# Patient Record
Sex: FEMALE | URBAN - METROPOLITAN AREA
[De-identification: names, ages, dates, MRNs, and addresses within clinical notes are randomized per-mention and may not be internally consistent; named-entity substitution may affect disease eponyms.]

---

## 2022-11-01 ENCOUNTER — APPOINTMENT (RX ONLY)
Dept: URBAN - METROPOLITAN AREA CLINIC 15 | Facility: CLINIC | Age: 22
Setting detail: DERMATOLOGY
End: 2022-11-01

## 2022-11-01 VITALS — WEIGHT: 120 LBS | HEIGHT: 64 IN

## 2022-11-01 DIAGNOSIS — L71.8 OTHER ROSACEA: ICD-10-CM

## 2022-11-01 PROCEDURE — ? PRESCRIPTION

## 2022-11-01 PROCEDURE — 99203 OFFICE O/P NEW LOW 30 MIN: CPT

## 2022-11-01 PROCEDURE — ? PRESCRIPTION MEDICATION MANAGEMENT

## 2022-11-01 RX ORDER — TRETIONIN 0.5 MG/G
1 CREAM TOPICAL
Qty: 45 | Refills: 0 | Status: ERX | COMMUNITY
Start: 2022-11-01

## 2022-11-01 RX ORDER — AZELAIC ACID 0.15 G/G
1 GEL TOPICAL QAM
Qty: 50 | Refills: 4 | Status: ERX | COMMUNITY
Start: 2022-11-01

## 2022-11-01 RX ORDER — SODIUM SULFACETAMIDE 100 MG/ML
1 LIQUID TOPICAL QAM
Qty: 480 | Refills: 3 | Status: ERX | COMMUNITY
Start: 2022-11-01

## 2022-11-01 RX ORDER — MINOCYCLINE 40 MG/G
1 AEROSOL, FOAM TOPICAL QHS
Qty: 30 | Refills: 4 | Status: ERX | COMMUNITY
Start: 2022-11-01

## 2022-11-01 RX ADMIN — AZELAIC ACID 1: 0.15 GEL TOPICAL at 00:00

## 2022-11-01 RX ADMIN — MINOCYCLINE 1: 40 AEROSOL, FOAM TOPICAL at 00:00

## 2022-11-01 RX ADMIN — TRETIONIN 1: 0.5 CREAM TOPICAL at 00:00

## 2022-11-01 RX ADMIN — SODIUM SULFACETAMIDE 1: 100 LIQUID TOPICAL at 00:00

## 2022-11-01 ASSESSMENT — LOCATION SIMPLE DESCRIPTION DERM
LOCATION SIMPLE: LEFT CHEEK
LOCATION SIMPLE: RIGHT CHEEK

## 2022-11-01 ASSESSMENT — LOCATION DETAILED DESCRIPTION DERM
LOCATION DETAILED: LEFT INFERIOR MEDIAL MALAR CHEEK
LOCATION DETAILED: LEFT INFERIOR CENTRAL MALAR CHEEK
LOCATION DETAILED: RIGHT INFERIOR CENTRAL MALAR CHEEK

## 2022-11-01 ASSESSMENT — LOCATION ZONE DERM: LOCATION ZONE: FACE

## 2022-11-01 NOTE — PROCEDURE: PRESCRIPTION MEDICATION MANAGEMENT
Initiate Treatment: .\\n\\nAM\\n\\n- Wash with sulfa Wash once daily in the mornings. \\n- Azelaic acid- Apply a thin layer to face once daily. \\n- Avene moisturizer products \\n\\nPM\\n\\n- Wash with gentle cleanser \\n- Tretinoin 0.05%- Apply pea sized amount to the entire face every other night Monday Wednesday and Friday. \\n- Amzeeq- Apply a thin layer to face nightly. \\n\\n*Will defer oral medications due to history of celiac disease. * \\n*Advised to start regimen, once irritation has subsided. *
Render In Strict Bullet Format?: No
Detail Level: Zone

## 2022-11-01 NOTE — HPI: PIMPLES (ACNE)
How Severe Is Your Acne?: moderate
Is This A New Presentation, Or A Follow-Up?: Acne
Additional Comments (Use Complete Sentences): Patient is in office for acne concerns on face. Pt states that she was on accutane about two years ago for eight months. Patient states that acne is back since accutane.

## 2022-12-13 ENCOUNTER — APPOINTMENT (RX ONLY)
Dept: URBAN - METROPOLITAN AREA CLINIC 15 | Facility: CLINIC | Age: 22
Setting detail: DERMATOLOGY
End: 2022-12-13

## 2022-12-13 VITALS — WEIGHT: 115 LBS | HEIGHT: 65 IN

## 2022-12-13 DIAGNOSIS — L71.8 OTHER ROSACEA: ICD-10-CM

## 2022-12-13 PROCEDURE — ? PRESCRIPTION

## 2022-12-13 PROCEDURE — 99213 OFFICE O/P EST LOW 20 MIN: CPT

## 2022-12-13 PROCEDURE — ? PRESCRIPTION MEDICATION MANAGEMENT

## 2022-12-13 RX ORDER — MINOCYCLINE 40 MG/G
1 AEROSOL, FOAM TOPICAL QHS
Qty: 30 | Refills: 4 | Status: ERX

## 2022-12-13 RX ORDER — CLASCOTERONE 1 G/100G
1 CREAM TOPICAL BID
Qty: 60 | Refills: 2 | Status: ERX | COMMUNITY
Start: 2022-12-13

## 2022-12-13 RX ADMIN — CLASCOTERONE 1: 1 CREAM TOPICAL at 00:00

## 2022-12-13 NOTE — PROCEDURE: PRESCRIPTION MEDICATION MANAGEMENT
Modify Regimen: .\\n\\nAM\\n\\n- Wash with sulfa Wash once daily in the mornings. \\n- Azelaic acid- Apply a thin layer to face once daily. \\nAlveta Doing 1 % topical cream- Apply to face twice daily. \\n- Avene moisturizer products \\n\\nPM\\n\\n- Wash with gentle cleanser \\n- Tretinoin 0.05%- Apply pea sized amount to the entire face every other night Monday Wednesday and Friday. \\n- Amzeeq- Apply a thin layer to face nightly. \\nAlveta Doing 1 % topical cream - Apply to face twice daily. \\n\\n\\n*Will defer oral medications due to history of celiac disease. *
Render In Strict Bullet Format?: No
Plan: .\\n\\n- Recommended to use Yane Cream products (fragrance-free moisturizers). \\n\\nI counseled the patient regarding the following:\\n. \\nWe discussed the risks and benefits of intense pulsed light treatment including but not limited to lightening or darkening of treated skin, crusting, scabbing, scarring, reactivation of cold sores, infection, redness,  accidental hair removal, swelling and bruising. Strict sun avoidance emphasized. Patient understands that multiple treatments may be necessary, and not all unwanted brown spots can be removed. Patient aware of alternatives to IPL, including q switched lasers, bleaching creams, chemical peels, retinoids, cryotherapy, and fraxel. Patient understands that the treatment is cosmetic in nature and not covered by insurance. \\n.\\n\\n\\nIPL \\n\\n- Recommend 3 sessions (monthly apart) full face for redness $400 each session. \\n\\n** Discontinue Retinoids 7 days prior to treatment and 7 days post treatment. Avoid sun exposure 2 weeks prior and 2 weekspost treatment. Do not shave, laser and/or wax the area. **
Detail Level: Zone